# Patient Record
(demographics unavailable — no encounter records)

---

## 2024-10-24 NOTE — HISTORY OF PRESENT ILLNESS
[FreeTextEntry1] : She recently had a capsule endoscopy to evaluate Hemoccult positive stools as  her colonoscopy and upper endoscopy were negative.  She is feeling well except for occasional episodes of blood seen when she wipes herself after  a bowel movement.  She also admits to occasional diarrhea.  She also was told she has gastroesophageal reflux disease by her ENT doctor.  Her capsule endoscopy revealed mild gastritis.  Her small intestine was completely normal except for on a  few images, a worm was seen.  She denies travel history. She has not been out of the country.  She does have 2 cats but they domesticated and never have been outside.

## 2024-10-24 NOTE — CONSULT LETTER
[Dear  ___] : Dear  [unfilled], [Consult Letter:] : I had the pleasure of evaluating your patient, [unfilled]. [( Thank you for referring [unfilled] for consultation for _____ )] : Thank you for referring [unfilled] for consultation for [unfilled] [Please see my note below.] : Please see my note below. [Consult Closing:] : Thank you very much for allowing me to participate in the care of this patient.  If you have any questions, please do not hesitate to contact me. [Sincerely,] : Sincerely, [FreeTextEntry3] : Max Munroe MD  Gastroenterology [facility]

## 2024-10-24 NOTE — ASSESSMENT
[FreeTextEntry1] : Pantoprazole 40 mg daily Start Proctozone  2.5% hemorrhoidal cream twice a day to her internal hemorrhoids Stool for ova and parasite was ordered  Office follow-up in 1 month   I spent 31 minutes with the patient and answered all of her question Inflammation Suggestive Of Cancer Camouflage Histology Text: There was a dense lymphocytic infiltrate which prevented adequate histologic evaluation of adjacent structures.

## 2024-12-26 NOTE — CONSULT LETTER
[Dear  ___] : Dear  [unfilled], [Consult Letter:] : I had the pleasure of evaluating your patient, [unfilled]. [( Thank you for referring [unfilled] for consultation for _____ )] : Thank you for referring [unfilled] for consultation for [unfilled] [Please see my note below.] : Please see my note below. [Consult Closing:] : Thank you very much for allowing me to participate in the care of this patient.  If you have any questions, please do not hesitate to contact me. [Sincerely,] : Sincerely, [FreeTextEntry3] : Max Munroe MD  Gastroenterology [facility] [place]

## 2024-12-26 NOTE — HISTORY OF PRESENT ILLNESS
SHANNAN
[FreeTextEntry1] : She had stool for O & P which was negative for worms. She was placed on pantoprazole 40 mg daily and Proctozone hemorrhoidal cream.  She is feeling much better now with no heartburn or rectal bleeding.

## 2024-12-26 NOTE — ASSESSMENT
[FreeTextEntry1] : Continue pantoprazole Will start tapering on her next visit.  Office follow up in 4 months